# Patient Record
Sex: FEMALE | Race: WHITE | NOT HISPANIC OR LATINO | URBAN - METROPOLITAN AREA
[De-identification: names, ages, dates, MRNs, and addresses within clinical notes are randomized per-mention and may not be internally consistent; named-entity substitution may affect disease eponyms.]

---
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
## 2023-05-01 NOTE — ED PROVIDER NOTE - CARE PROVIDER_API CALL
New prescription sent for 1.8 mg of Victoza.     Encounter closed.    Jean Claude Navarro)  Pediatrics  [address] [place] [address]  Phone: [phone]  Fax: [phone]  Follow Up Time: 1-3 Days    Rodrigo Gardiner  [address] [place] [address]  Phone: [phone]  Fax: (   )    -  Follow Up Time: